# Patient Record
(demographics unavailable — no encounter records)

---

## 2024-10-14 NOTE — HEALTH RISK ASSESSMENT
[Patient reported mammogram was normal] : Patient reported mammogram was normal [Patient reported colonoscopy was normal] : Patient reported colonoscopy was normal [MammogramDate] : 01/24 [MammogramComments] : repeat 1 year  [PapSmearDate] : 01/16 [PapSmearComments] : s/p VERONICA   2n2 Endometriosis  [BoneDensityDate] : 02/21 [BoneDensityComments] : to be repeated 1/2025 c Rheum.  Dr. Tam  [ColonoscopyDate] : 02/23 [ColonoscopyComments] : Dr. Jaden Kwong  repeat 5 yrs  [phone]

## 2024-10-14 NOTE — PHYSICAL EXAM

## 2024-10-14 NOTE — HISTORY OF PRESENT ILLNESS
[FreeTextEntry1] : NP-CPE [de-identified] : NP CPE   Former PCP   "Pablito Kiran"  CPE  as above negative fast  no CP/SOB c activity no dizziness no  palpitations no syncope  no N/V/D +BM qd-bid NL no bloody/black stools  no acute change in bowel  habits no urinary complaints

## 2024-10-14 NOTE — PLAN
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

## 2025-01-08 NOTE — PROCEDURE
[Large Joint Injection] : Large joint injection [Right] : of the right [Subacromial Space] : subacromial space [Pain] : pain [Inflammation] : inflammation [Betadine] : betadine [Sterile technique used] : sterile technique used [___ cc    6mg] :  Betamethasone (Celestone) ~Vcc of 6mg [___ cc    1%] : Lidocaine ~Vcc of 1%  [] : Patient tolerated procedure well [Call if redness, pain or fever occur] : call if redness, pain or fever occur [Apply ice for 15min out of every hour for the next 12-24 hours as tolerated] : apply ice for 15 minutes out of every hour for the next 12-24 hours as tolerated [Patient was advised to rest the joint(s) for ____ days] : patient was advised to rest the joint(s) for [unfilled] days [Risks, benefits, alternatives discussed / Verbal consent obtained] : the risks benefits, and alternatives have been discussed, and verbal consent was obtained

## 2025-01-08 NOTE — DISCUSSION/SUMMARY
[de-identified] :  MARIO BLUM 55 year F was seen and evaluated in office today. Following evaluation, the natural history of the pathology was explained in full to the patient in layman's terms.   Several different treatment options were discussed and explained in full to the patient, along with specific risks and benefits of both surgical and non-surgical treatments. Nonsurgical options including but not limited to Corticosteroid Injection, Visco supplementation, Prescription anti-inflammatory medications (both steroidal and non-steroidal), activity modification, non-impact exercise, maintaining a healthy BMI, bracing, and icing were all reviewed. Surgical options including but not limited to arthroscopy, and joint replacement along with other indicated procedures were discussed.   Discussed risk of morbidity associated with proposed treatment plans. These risks include, but are not limited to, the risk associated with prescription strength medications and possible side effects of these medications which include GI hemorrhage with oral medications along with cardiac/renal issues with long term use. Risks with all pertinent surgical interventions as discussed with patient including infection, bleeding, anesthesia complications, NV injury, fracture, DVT, or heterotopic ossification.   Furthermore, discussed with MARIO that they could also delay any immediate treatment options and continue to observe and self-care for the discussed problem. Discussed Home Exercise Programs as well as Rest, Ice and elevation. Patient had ample time to ask any questions about todays visit and the diagnosis, and all questions were answered. Patient understands the plan going forward.   Lastly, based on this patient's presentation at our office, which is an orthopedic specialist office, this patient inherently / intrinsically has a risk of progression of symptoms/condition, as well as development and/or exacerbation of cardiovascular disease/conditions, obesity, DVT, worsening and chronic pain, and permanent loss of function, as well as decreased abilities to complete activities of daily life.  PLAN:   At this time, patient has significant multilevel degenerative changes in the Cervical spine, along with complaints consistent with cervical radiculopathy. Patient was advised on the diagnosis at length today and further discussed with patient that they should be evaluated by an orthopedic spine specialist. Patient was sent for Cervical Spine MRI w/o contrast at this time to evaluate for HNP/Stenosis causing resultant complaints of radicular pain down RT upper extremities. Patient was advised to follow up as needed pending orthopedic spine specialist evaluation. Patient expressed understanding of this at this time.   Patient was given a CSI to the RIGHT SUBACROMIAL SPACE, advised to ice if sore and will observe over the next 24 hours for any redness, swelling or increased discomfort. The indication for this procedure was pain and inflammation. The site was prepped with betadine and sterile technique used. An injection of Lidocaine 5cc of 1% was used Betamethasone (Celestone) 1cc of 6mg.  The patient tolerated procedure well. They were advised to call if redness, pain or fever occur and apply ice for 15 minutes out of every hour for the next 12-24 hours as tolerated. The risks benefits, and alternatives have been discussed. These risks include infection, hemarthrosis, allergic reaction, bleeding and hyperglycemia. Verbal understanding and consent was obtained. There is a moderate risk of morbidity with further treatment, especially from use of prescription strength medication.  Entered by Braeden Echols acting as scribe. Dr. Conley Attestation The documentation recorded by the scribe, in my presence, accurately reflects the service I, Dr. Conley, personally performed, and the decisions made by me with my edits as appropriate. .

## 2025-01-08 NOTE — HISTORY OF PRESENT ILLNESS
[de-identified] : 01/08/2025  MARIO BLUM 55 year y/o F presents today for right shoulder pain. Patient reports a constant posterior shoulder pain. Patient reports her pain radiates from her shoulder down to her wrist. Patient reports she has hx of cervical spinal stenosis on the right side.  She denies any paresthesia's however she mentions she sometimes drops things lately.    Date of Onset: 12/26/24 Mechanism of injury: NKI   Pain:    At Rest: 5/10    With Activity: 7/10   Have you been treated for this in the past? No OTC/Rx Medication: Advil PRN with minimal relief. Heat, Ice, Elevation: Ice with relief Previous Imaging/Studies: No

## 2025-01-08 NOTE — PHYSICAL EXAM
[Straightening consistent with spasm] : Straightening consistent with spasm [Disc space narrowing] : Disc space narrowing [Right] : right shoulder [Type 2 acromion] : Type 2 acromion [de-identified] : Constitutional:  The patient appears well developed, well nourished.  Examination of patients ability to communicate functionally was normal.       Neurologic: Coordination is normal.  Alert and oriented to time, place and person.  No evidence of mood disorder, calm affect.       Neck:      Inspection of the cervical spine is as follows: no scars, no erythema, no ecchymosis, no masses and no rashes.       Palpation of the cervical spine is as follows:Right trap spasm     Range of motion of the cervical spine is as follows in degrees:  Stiffness at extremes of flexion, extension, rotation to right.    Forward flexion is 30   Extension is 20   Left lateral fexion is 20   Right lateral flexion is 20   Left lateral rotation is 55   Right lateral rotation is 35      Strength Testing for the cervical spine is as follows:    Left Deltoid strength 5/5   Right Deltoid strength 5/5   Left Biceps 5/5   Right Biceps 5/5   Left Triceps 5/5   Right Triceps 5/5   Left Wrist Flexors 5/5   Right Wrist Flexors 5/5   Left Finger Abductors 5/5   Right Finger Abductors 5/5   Left Grasp 5/5    Right Grasp 5/5       Neurological testing for the cervical spine is as follows: light touch is intact throughout both upper extremities and normal deep tendon reflexes bilateral upper extremities    Deep tendon reflexes    LEFT: Biceps 2+, Triceps 2+ and Brachioradialis 2+. Deep tendon reflexes    RIGHT: Biceps 2+, Triceps 2+ and Brachioradialis 2+.   Sensation of the RIGHT upper extremity is intact.    Sensation of the LEFT upper extremity is intact.  Constitutional: The patient appears well developed, well nourished. Examination of patients ability to communicate functionally was normal.       Neurologic: Coordination is normal. Alert and oriented to time, place and person. No evidence of mood disorder, calm affect.        RIGHT     SHOULDER: Inspection of the shoulder/upper arm is as follows: no swelling, no erythema, no ecchymosis, no atrophy and no deformity.       Palpation of the shoulder/upper arm is as follows: Tenderness is noted at the anterior shoulder and lateral shoulder and bicipital groove       Range of motion of the shoulder is as follows in degrees:   Pain with internal rotation, external rotation, abduction, and forward flexion.       active forward flexion to: 180    active abduction to: 180     internal rotation to: T8    external rotation with arm to side: 70      Strength of the shoulder is as follows:       Forward flexion 5/5   Abduction 5/5   External Rotation 5/5     Internal Rotation 5/5       Ligament Stability and Special Tests of the shoulder is as follows: Impingement testing is positive. Hawkin's testing is positive. There is positive arc of pain. Shoulder apprehension shows to be negative. Shoulder relocation is negative. Drop-arm testing is negative.       Neurological testing of the shoulder is as follows: reflexes intact, No sensory deficits, motor and sensor intact distally and no scapular winging.     [FreeTextEntry1] : ap/lat show reversal of cervical lordosis and spurring C5-6 with disc space narrowing

## 2025-03-20 NOTE — PHYSICAL EXAM
[de-identified] : Constitutional: Well groomed and developed.  Respiratory: Normal, unlabored breathing. No use of accessory muscles.  Skin: No rashes or ulcers. Skin warm and dry.  Psychiatric: Oriented to time, place, person and event. No acute distress.   Neck:    Posture: normal   AROM:  Flexion- chin to chest  Extension- 20  R rotation- 45 L rotation- 60  Moderate pain with neck ROM.  Gait: Heel to toe Patient able to walk on toes and heels.   Tenderness:  Cervical: Moderate tenderness on palpation    Motor:                        R               L              UE:                   Deltoid            5                5 WE                 5                5 Triceps          5                5                5                5 Intrinsics       5                5 Biceps          5                5                                  R         L DTR:  Biceps:                     2+        2+ Brachioradialis:        2+        2+ Triceps:                   2+         2+   Sensory: Light touch sensation intact on C5-T1  Spurling sign: Bilaterally Positive  Babinski's sign: Negative Bilaterally Navarro's sign: Negative Bilaterally  Abduction sign: Negative Bilaterally

## 2025-03-20 NOTE — DATA REVIEWED
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

## 2025-03-20 NOTE — HISTORY OF PRESENT ILLNESS
[de-identified] :  Body Part: cervical spine  Length of symptoms/ DOI: 2024 Cause of accident/ injury: NKI  Radicular symptoms: down the RT arm. Some tingling in her last 3 fingers.  Quality of pain: sharp and dull. Tightness  Pain at Rest:  7/10 Pain with activity/ walkin/10 Pain worsens with: In the afternoon  Pain improves with: ice  Previous treatments:  Saw KIM on 2025 given RT shoulder CSI  Recent Imaging: MRI Cervical spine ZP Current treatments: none  Current medications for pain: Advil prn  Work status/ occupation: Teacher  Assisted walking device: none

## 2025-03-20 NOTE — ASSESSMENT
[FreeTextEntry1] : 56 yo female with neck pain ongoing for 3 months. Patient has complaints consistent with right sided radiculopathy and associated paresthesia's. reviewed MRI of the cervical spine showing moderate to advanced HNP C4-6. At this time recommend PT and possible YVONNE.   - Referral to pain management Dr. Osorio for YVONNE  - Recommend physical therapy to regain range of motion, strengthening and symptomatic improvement. Prescription given in office today.   - Recommend NSAIDs PRN - Recommend heating pad use to decrease muscle spasm - Discussed the importance of home exercises, including but not limited to neck stretching and cervical traction   Patient was educated on their diagnosis today. All questions answered and patient expressed understanding.   Follow up in 2 months